# Patient Record
Sex: MALE | Race: WHITE | NOT HISPANIC OR LATINO | Employment: OTHER | URBAN - NONMETROPOLITAN AREA
[De-identification: names, ages, dates, MRNs, and addresses within clinical notes are randomized per-mention and may not be internally consistent; named-entity substitution may affect disease eponyms.]

---

## 2017-01-04 ENCOUNTER — OFFICE VISIT (OUTPATIENT)
Dept: ORTHOPEDIC SURGERY | Facility: CLINIC | Age: 47
End: 2017-01-04

## 2017-01-04 VITALS — WEIGHT: 226 LBS | HEIGHT: 73 IN | BODY MASS INDEX: 29.95 KG/M2 | RESPIRATION RATE: 16 BRPM

## 2017-01-04 DIAGNOSIS — S91.301D WOUND, OPEN, FOOT, RIGHT, SUBSEQUENT ENCOUNTER: Primary | ICD-10-CM

## 2017-01-04 DIAGNOSIS — E11.49 TYPE 2 DIABETES MELLITUS WITH OTHER NEUROLOGIC COMPLICATION: ICD-10-CM

## 2017-01-04 PROCEDURE — 99213 OFFICE O/P EST LOW 20 MIN: CPT | Performed by: PODIATRIST

## 2017-01-04 NOTE — MR AVS SNAPSHOT
Stephan Sterling   1/4/2017 1:20 PM   Office Visit    Dept Phone:  [phone]   Encounter #:  [id number]    Provider:  Joseph Null DPM   Department:  [facility]                Your Full Care Plan              Your Updated Medication List          This list is accurate as of: 1/4/17  1:17 PM.  Always use your most recent med list.                glimepiride 2 MG tablet   Commonly known as:  AMARYL       metFORMIN  MG 24 hr tablet   Commonly known as:  GLUCOPHAGE-XR       predniSONE 10 MG tablet   Commonly known as:  DELTASONE       sulfamethoxazole-trimethoprim 800-160 MG per tablet   Commonly known as:  BACTRIM DS,SEPTRA DS               You Were Diagnosed With        Codes Comments    Wound, open, foot, right, subsequent encounter    -  Primary ICD-10-CM: S91.301D  ICD-9-CM: V58.89, 892.0     Type 2 diabetes mellitus with other neurologic complication     ICD-10-CM: E11.49       Medications to be Given to You by a Medical Professional     Due       Frequency    11/23/2016 collagenase ointment  Every 24 Hours Scheduled      Instructions     None    Patient Instructions History      Upcoming Appointments     Visit Type Date Time Department    OFFICE VISIT 1/4/2017  1:20 PM [facility]      MyChart Signup     Our records indicate that you have declined [facility] Many[facility] signup. If you would like to sign up for Manyetahart, please email [employer] [email or web address] or call [phone] to obtain an activation code.             Other Info from Your Visit           Your Appointments     Jan 04, 2017  1:20 PM EST   Office Visit with Joseph Null DPM   [facility]INE (--)    [address] [place] [id number]   [phone]           Arrive 15 minutes prior to appointment.              Allergies     No Known Allergies      Reason for Visit     Right Foot -  "Follow-up     Wound Check           Vital Signs     Respirations Height Weight Body Mass Index Smoking Status       16 73\" (185.4 cm) 226 lb (103 kg) 29.82 kg/m2 Never Smoker       Problems and Diagnoses Noted     Open wound of foot    -  Primary    Type 2 diabetes mellitus with other neurologic complication            "

## 2017-01-04 NOTE — PROGRESS NOTES
Subjective Right foot  Patient ID: Stephan Sterling is a 46 y.o. male   With diabetes is here for follow-up for his plantar right foot wound.  He states he hasn't palmar Dr. Ortega on the 25th for diabetic shoes and inserts.    History of Present Illness    Review of Systems   Constitutional: Negative for diaphoresis, fever and unexpected weight change.   HENT: Negative for dental problem and sore throat.    Eyes: Negative for visual disturbance.   Respiratory: Negative for shortness of breath.    Cardiovascular: Negative for chest pain.   Gastrointestinal: Negative for abdominal pain, constipation, diarrhea, nausea and vomiting.   Genitourinary: Negative for difficulty urinating and frequency.   Neurological: Negative for headaches.   Hematological: Does not bruise/bleed easily.   All other systems reviewed and are negative.      History reviewed. No pertinent past medical history.     History reviewed. No pertinent past surgical history.    No Known Allergies    Objective   Physical Exam   Constitutional: He appears well-developed and well-nourished.   Neurological: He is alert.   Psychiatric: He has a normal mood and affect. His behavior is normal.   Vitals reviewed.    Ortho Exam  Ortho Exam    He still has a cavovarus foot type with increased medial longitudinal arch.  His a plantar arch wound on the right foot completely healed and re-epithelialized now.    Assessment/Plan  resolved right foot wound  Independent Review of Radiographic Studies:      Laboratory and Other Studies:     Medical Decision Making:        Procedures  Debridement Note        Recommendations/Plan:  I recommend he just put lotion or some type of moisturizing agent on his foot and followed by Dr. Ortega for diabetic shoes and inserts.  If He has any complications or issues he can let me know.    No Follow-up on file.  Patient agreeable to call or return sooner for any concerns.

## 2020-04-09 ENCOUNTER — HOSPITAL ENCOUNTER (EMERGENCY)
Facility: HOSPITAL | Age: 50
Discharge: ANOTHER HOSPITAL | End: 2020-04-09
Attending: EMERGENCY MEDICINE | Admitting: EMERGENCY MEDICINE

## 2020-04-09 VITALS
TEMPERATURE: 99 F | OXYGEN SATURATION: 93 % | RESPIRATION RATE: 18 BRPM | WEIGHT: 274 LBS | SYSTOLIC BLOOD PRESSURE: 159 MMHG | HEART RATE: 97 BPM | DIASTOLIC BLOOD PRESSURE: 92 MMHG | BODY MASS INDEX: 32.35 KG/M2 | HEIGHT: 77 IN

## 2020-04-09 DIAGNOSIS — S93.05XA DISLOCATION OF LEFT ANKLE JOINT, INITIAL ENCOUNTER: ICD-10-CM

## 2020-04-09 DIAGNOSIS — S82.892A CLOSED FRACTURE OF LEFT ANKLE, INITIAL ENCOUNTER: Primary | ICD-10-CM

## 2020-04-09 LAB
ALBUMIN SERPL-MCNC: 3.3 G/DL (ref 3.5–5.2)
ALBUMIN/GLOB SERPL: 0.7 G/DL
ALP SERPL-CCNC: 244 U/L (ref 39–117)
ALT SERPL W P-5'-P-CCNC: 59 U/L (ref 1–41)
ANION GAP SERPL CALCULATED.3IONS-SCNC: 15.3 MMOL/L (ref 5–15)
AST SERPL-CCNC: 52 U/L (ref 1–40)
BASOPHILS # BLD AUTO: 0.04 10*3/MM3 (ref 0–0.2)
BASOPHILS NFR BLD AUTO: 0.3 % (ref 0–1.5)
BILIRUB SERPL-MCNC: 0.9 MG/DL (ref 0.2–1.2)
BUN BLD-MCNC: 17 MG/DL (ref 6–20)
BUN/CREAT SERPL: 12.9 (ref 7–25)
CALCIUM SPEC-SCNC: 9.6 MG/DL (ref 8.6–10.5)
CHLORIDE SERPL-SCNC: 95 MMOL/L (ref 98–107)
CO2 SERPL-SCNC: 22.7 MMOL/L (ref 22–29)
CREAT BLD-MCNC: 1.32 MG/DL (ref 0.76–1.27)
DEPRECATED RDW RBC AUTO: 41.4 FL (ref 37–54)
EOSINOPHIL # BLD AUTO: 0.02 10*3/MM3 (ref 0–0.4)
EOSINOPHIL NFR BLD AUTO: 0.1 % (ref 0.3–6.2)
ERYTHROCYTE [DISTWIDTH] IN BLOOD BY AUTOMATED COUNT: 12.6 % (ref 12.3–15.4)
GFR SERPL CREATININE-BSD FRML MDRD: 57 ML/MIN/1.73
GLOBULIN UR ELPH-MCNC: 4.7 GM/DL
GLUCOSE BLD-MCNC: 212 MG/DL (ref 65–99)
HCT VFR BLD AUTO: 32.4 % (ref 37.5–51)
HGB BLD-MCNC: 10.8 G/DL (ref 13–17.7)
IMM GRANULOCYTES # BLD AUTO: 0.09 10*3/MM3 (ref 0–0.05)
IMM GRANULOCYTES NFR BLD AUTO: 0.6 % (ref 0–0.5)
LYMPHOCYTES # BLD AUTO: 1.23 10*3/MM3 (ref 0.7–3.1)
LYMPHOCYTES NFR BLD AUTO: 8.3 % (ref 19.6–45.3)
MCH RBC QN AUTO: 30.2 PG (ref 26.6–33)
MCHC RBC AUTO-ENTMCNC: 33.3 G/DL (ref 31.5–35.7)
MCV RBC AUTO: 90.5 FL (ref 79–97)
MONOCYTES # BLD AUTO: 1.53 10*3/MM3 (ref 0.1–0.9)
MONOCYTES NFR BLD AUTO: 10.3 % (ref 5–12)
NEUTROPHILS # BLD AUTO: 11.91 10*3/MM3 (ref 1.7–7)
NEUTROPHILS NFR BLD AUTO: 80.4 % (ref 42.7–76)
NRBC BLD AUTO-RTO: 0 /100 WBC (ref 0–0.2)
PLATELET # BLD AUTO: 518 10*3/MM3 (ref 140–450)
PMV BLD AUTO: 8.9 FL (ref 6–12)
POTASSIUM BLD-SCNC: 5.2 MMOL/L (ref 3.5–5.2)
PROT SERPL-MCNC: 8 G/DL (ref 6–8.5)
RBC # BLD AUTO: 3.58 10*6/MM3 (ref 4.14–5.8)
SODIUM BLD-SCNC: 133 MMOL/L (ref 136–145)
WBC NRBC COR # BLD: 14.82 10*3/MM3 (ref 3.4–10.8)

## 2020-04-09 PROCEDURE — 96365 THER/PROPH/DIAG IV INF INIT: CPT

## 2020-04-09 PROCEDURE — 25010000002 VANCOMYCIN 5 G RECONSTITUTED SOLUTION 5,000 MG VIAL: Performed by: PHYSICIAN ASSISTANT

## 2020-04-09 PROCEDURE — 99284 EMERGENCY DEPT VISIT MOD MDM: CPT

## 2020-04-09 PROCEDURE — 85025 COMPLETE CBC W/AUTO DIFF WBC: CPT | Performed by: PHYSICIAN ASSISTANT

## 2020-04-09 PROCEDURE — 80053 COMPREHEN METABOLIC PANEL: CPT | Performed by: PHYSICIAN ASSISTANT

## 2020-04-09 RX ORDER — SODIUM CHLORIDE 0.9 % (FLUSH) 0.9 %
10 SYRINGE (ML) INJECTION AS NEEDED
Status: DISCONTINUED | OUTPATIENT
Start: 2020-04-09 | End: 2020-04-09 | Stop reason: HOSPADM

## 2020-04-09 RX ADMIN — VANCOMYCIN HYDROCHLORIDE 2000 MG: 500 INJECTION, POWDER, LYOPHILIZED, FOR SOLUTION INTRAVENOUS at 14:49

## 2020-04-09 NOTE — ED PROVIDER NOTES
"Subjective   This patient states 2 weeks ago he tripped and fell injuring his left ankle.  He did not seek care until today.  He had an x-ray performed which showed \"fracture dislocation with the talus dislocated laterally and anteriorly in relation to the distal tibia and calcaneus.  There is a transverse fracture of the distal fibula above the level of the lateral malleolus with mild posterior and lateral displacement of the distal fracture fragment.  There is probable talar fracture although is considerable overlap of all making evaluation of the talus difficult.  The talus is also dislocated from the adjacent navicular.\"  He has a history of diabetes, hyperlipidemia and hypertension.  He has been ambulating around on this ankle since the injury 2 weeks ago.  He states he has chronic open wounds to the foot and ankle as well.          Review of Systems   Constitutional: Negative.  Negative for fever.   HENT: Negative.    Eyes: Negative.    Respiratory: Negative.    Cardiovascular: Negative.    Gastrointestinal: Negative.    Genitourinary: Negative.    Musculoskeletal:        Left ankle pain, swelling, deformity   Skin:        Open wounds to the left foot and ankle   Allergic/Immunologic: Negative.    Neurological: Negative.    Psychiatric/Behavioral: Negative.    All other systems reviewed and are negative.      Past Medical History:   Diagnosis Date   • Diabetes mellitus (CMS/[facility])    • Hyperlipidemia    • Hypertension        No Known Allergies    History reviewed. No pertinent surgical history.    History reviewed. No pertinent family history.    Social History     Socioeconomic History   • Marital status: Single     Spouse name: Not on file   • Number of children: Not on file   • Years of education: Not on file   • Highest education level: Not on file   Tobacco Use   • Smoking status: Never Smoker   • Smokeless tobacco: Never Used   Substance and Sexual Activity   • Alcohol use: Never     Frequency: Never   • " Drug use: Never   • Sexual activity: Defer           Objective   Physical Exam   Constitutional: He appears well-developed and well-nourished.   HENT:   Head: Normocephalic and atraumatic.   Neck: Normal range of motion.   Cardiovascular: Normal rate and regular rhythm.   Pulmonary/Chest: Effort normal.   Musculoskeletal:   Obvious deformity to left ankle with open diabetic wounds with medicated cream and suspected purulent drainage from 1 on the lateral ankle and 1 on the medial foot.  Foot and toes has brisk capillary refill.   Neurological: He is alert.   Skin: Skin is warm and dry.   Mild erythema to the left foot and ankle with open wounds as mentioned above   Psychiatric: He has a normal mood and affect. His behavior is normal. Thought content normal.   Nursing note and vitals reviewed.      Procedures           ED Course  ED Course as of Apr 09 1621   Thu Apr 09, 2020   1445 WBC(!): 14.82 [TM]      ED Course User Index  [TM] Darrell Mai PA-C                                           MDM  I spoke with Dr. Morton regarding the patient's images and presentation and he states patient needs a higher level of care and transfer where I fellowship trained foot and ankle specialist can see and evaluate him.  Attempted to reach Dr. Juarez here but office phone rung unanswered. I spoke with Dr. Zenon Preciado orthopedic surgeon at [facility] who graciously accepts this patient in transfer to the emergency department at [place].  Final diagnoses:   Closed fracture of left ankle, initial encounter   Dislocation of left ankle joint, initial encounter            Darrell Mai PA-C  04/09/20 1547       Darrell Mai PA-C  04/09/20 1621

## 2020-10-18 ENCOUNTER — HOSPITAL ENCOUNTER (EMERGENCY)
Facility: HOSPITAL | Age: 50
Discharge: PSYCHIATRIC OR SUBSTANCE USE FACILITY | End: 2020-10-19
Attending: STUDENT IN AN ORGANIZED HEALTH CARE EDUCATION/TRAINING PROGRAM | Admitting: STUDENT IN AN ORGANIZED HEALTH CARE EDUCATION/TRAINING PROGRAM

## 2020-10-18 ENCOUNTER — APPOINTMENT (OUTPATIENT)
Dept: CT IMAGING | Facility: HOSPITAL | Age: 50
End: 2020-10-18

## 2020-10-18 DIAGNOSIS — M86.9 OSTEOMYELITIS OF LEFT ANKLE, UNSPECIFIED TYPE (HCC): ICD-10-CM

## 2020-10-18 DIAGNOSIS — M00.9 SEPTIC ARTHRITIS OF LEFT ANKLE, DUE TO UNSPECIFIED ORGANISM (HCC): Primary | ICD-10-CM

## 2020-10-18 LAB
ALBUMIN SERPL-MCNC: 2.6 G/DL (ref 3.5–5.2)
ALBUMIN/GLOB SERPL: 0.6 G/DL
ALP SERPL-CCNC: 404 U/L (ref 39–117)
ALT SERPL W P-5'-P-CCNC: 29 U/L (ref 1–41)
ANION GAP SERPL CALCULATED.3IONS-SCNC: 9.4 MMOL/L (ref 5–15)
AST SERPL-CCNC: 22 U/L (ref 1–40)
BASOPHILS # BLD AUTO: 0.06 10*3/MM3 (ref 0–0.2)
BASOPHILS NFR BLD AUTO: 0.4 % (ref 0–1.5)
BILIRUB SERPL-MCNC: 1.2 MG/DL (ref 0–1.2)
BUN SERPL-MCNC: 13 MG/DL (ref 6–20)
BUN/CREAT SERPL: 10.8 (ref 7–25)
CALCIUM SPEC-SCNC: 8.8 MG/DL (ref 8.6–10.5)
CHLORIDE SERPL-SCNC: 104 MMOL/L (ref 98–107)
CO2 SERPL-SCNC: 21.6 MMOL/L (ref 22–29)
CREAT SERPL-MCNC: 1.2 MG/DL (ref 0.76–1.27)
D-LACTATE SERPL-SCNC: 0.8 MMOL/L (ref 0.5–2)
DEPRECATED RDW RBC AUTO: 49.9 FL (ref 37–54)
EOSINOPHIL # BLD AUTO: 0.22 10*3/MM3 (ref 0–0.4)
EOSINOPHIL NFR BLD AUTO: 1.5 % (ref 0.3–6.2)
ERYTHROCYTE [DISTWIDTH] IN BLOOD BY AUTOMATED COUNT: 15.3 % (ref 12.3–15.4)
GFR SERPL CREATININE-BSD FRML MDRD: 64 ML/MIN/1.73
GLOBULIN UR ELPH-MCNC: 4.1 GM/DL
GLUCOSE SERPL-MCNC: 173 MG/DL (ref 65–99)
HCT VFR BLD AUTO: 26.3 % (ref 37.5–51)
HGB BLD-MCNC: 8.3 G/DL (ref 13–17.7)
IMM GRANULOCYTES # BLD AUTO: 0.09 10*3/MM3 (ref 0–0.05)
IMM GRANULOCYTES NFR BLD AUTO: 0.6 % (ref 0–0.5)
LYMPHOCYTES # BLD AUTO: 1.76 10*3/MM3 (ref 0.7–3.1)
LYMPHOCYTES NFR BLD AUTO: 12.3 % (ref 19.6–45.3)
MCH RBC QN AUTO: 28.1 PG (ref 26.6–33)
MCHC RBC AUTO-ENTMCNC: 31.6 G/DL (ref 31.5–35.7)
MCV RBC AUTO: 89.2 FL (ref 79–97)
MONOCYTES # BLD AUTO: 1.32 10*3/MM3 (ref 0.1–0.9)
MONOCYTES NFR BLD AUTO: 9.2 % (ref 5–12)
NEUTROPHILS NFR BLD AUTO: 10.9 10*3/MM3 (ref 1.7–7)
NEUTROPHILS NFR BLD AUTO: 76 % (ref 42.7–76)
NRBC BLD AUTO-RTO: 0 /100 WBC (ref 0–0.2)
PLATELET # BLD AUTO: 440 10*3/MM3 (ref 140–450)
PMV BLD AUTO: 8.5 FL (ref 6–12)
POTASSIUM SERPL-SCNC: 3.9 MMOL/L (ref 3.5–5.2)
PROT SERPL-MCNC: 6.7 G/DL (ref 6–8.5)
RBC # BLD AUTO: 2.95 10*6/MM3 (ref 4.14–5.8)
SODIUM SERPL-SCNC: 135 MMOL/L (ref 136–145)
WBC # BLD AUTO: 14.35 10*3/MM3 (ref 3.4–10.8)

## 2020-10-18 PROCEDURE — 83605 ASSAY OF LACTIC ACID: CPT | Performed by: STUDENT IN AN ORGANIZED HEALTH CARE EDUCATION/TRAINING PROGRAM

## 2020-10-18 PROCEDURE — 85025 COMPLETE CBC W/AUTO DIFF WBC: CPT | Performed by: STUDENT IN AN ORGANIZED HEALTH CARE EDUCATION/TRAINING PROGRAM

## 2020-10-18 PROCEDURE — 99283 EMERGENCY DEPT VISIT LOW MDM: CPT

## 2020-10-18 PROCEDURE — 73701 CT LOWER EXTREMITY W/DYE: CPT

## 2020-10-18 PROCEDURE — 80053 COMPREHEN METABOLIC PANEL: CPT | Performed by: STUDENT IN AN ORGANIZED HEALTH CARE EDUCATION/TRAINING PROGRAM

## 2020-10-18 PROCEDURE — 87040 BLOOD CULTURE FOR BACTERIA: CPT | Performed by: STUDENT IN AN ORGANIZED HEALTH CARE EDUCATION/TRAINING PROGRAM

## 2020-10-18 PROCEDURE — 96365 THER/PROPH/DIAG IV INF INIT: CPT

## 2020-10-18 PROCEDURE — 25010000002 VANCOMYCIN 1 G RECONSTITUTED SOLUTION 1 EACH VIAL: Performed by: STUDENT IN AN ORGANIZED HEALTH CARE EDUCATION/TRAINING PROGRAM

## 2020-10-18 PROCEDURE — 25010000002 IOPAMIDOL 61 % SOLUTION: Performed by: STUDENT IN AN ORGANIZED HEALTH CARE EDUCATION/TRAINING PROGRAM

## 2020-10-18 RX ADMIN — IOPAMIDOL 100 ML: 612 INJECTION, SOLUTION INTRAVENOUS at 23:28

## 2020-10-18 RX ADMIN — VANCOMYCIN HYDROCHLORIDE 1000 MG: 1 INJECTION, POWDER, LYOPHILIZED, FOR SOLUTION INTRAVENOUS at 23:25

## 2020-10-19 VITALS
DIASTOLIC BLOOD PRESSURE: 63 MMHG | OXYGEN SATURATION: 99 % | SYSTOLIC BLOOD PRESSURE: 110 MMHG | HEART RATE: 91 BPM | WEIGHT: 234 LBS | HEIGHT: 77 IN | TEMPERATURE: 98.4 F | RESPIRATION RATE: 19 BRPM | BODY MASS INDEX: 27.63 KG/M2

## 2020-10-19 NOTE — ED NOTES
Contacted [facility], Lab, to obtain blood [place].      Robin, ALEJANDRINA Thompson  10/18/20 2794

## 2020-10-19 NOTE — ED PROVIDER NOTES
Subjective   50-year-old male that presents with left foot and ankle pain and swelling.  States it has worsened over the past 3 days after wearing a walking boot.  The patient had a trimalleolar fracture several months ago that was repaired at the [facility].  Patient states he is still been wearing the walking boot.  States that he had an ulceration on the top of his foot that he is covered with Silvadene.  Patient states that it hurts a little bit but it is not that bad.  He has been taking Motrin for this.  Patient does have diabetic neuropathy in his feet.  He denies fever, chills, sweats or other systemic symptoms.          Review of Systems   All other systems reviewed and are negative.      Past Medical History:   Diagnosis Date   • Diabetes mellitus (CMS/[facility])    • Hyperlipidemia    • Hypertension        No Known Allergies    History reviewed. No pertinent surgical history.    History reviewed. No pertinent family history.    Social History     Socioeconomic History   • Marital status: Single     Spouse name: Not on file   • Number of children: Not on file   • Years of education: Not on file   • Highest education level: Not on file   Tobacco Use   • Smoking status: Never Smoker   • Smokeless tobacco: Never Used   Substance and Sexual Activity   • Alcohol use: Never     Frequency: Never   • Drug use: Never   • Sexual activity: Defer           Objective   Physical Exam  Vitals signs and nursing note reviewed.   Musculoskeletal:        Feet:      GEN: No acute distress  Head: Normocephalic, atraumatic  Eyes: Pupils equal round reactive to light  ENT: Posterior pharynx normal in appearance, oral mucosa is moist  Chest: Nontender to palpation  Cardiovascular: Regular rate  Lungs: Clear to auscultation bilaterally  Abdomen: Soft, nontender, nondistended, no peritoneal signs  Extremities: See graphic documentation  Neuro: GCS 15  Psych: Mood and affect are appropriate    Procedures           ED  Course  ED Course as of Oct 19 0038   Mon Oct 19, 2020   0022 Spoke with Dr. Sevilla, orthopedics, who recommended the patient since he had been operated on at the [facility] return back to them.    [DT]   0025 Spoke with Mesha  Jeannine, gave her the patient information.  She is going to call me back when she is in touch with surgeon.    [DT]   0032 Dr. Fox, orthopedics at , accepted for transfer to their emergency department.    [DT]      ED Course User Index  [DT] Zenon Mohan MD                                           MDM  Number of Diagnoses or Management Options  Osteomyelitis of left ankle, unspecified type (CMS/HCC):   Septic arthritis of left ankle, due to unspecified organism (CMS/HCC):   Diagnosis management comments: Will get labs and once I get a creatinine back we will determine what kind of imaging I can do to assess whether he has an abscess or if this is more edema.  Patient had a trimalleolar fracture for 5 months ago and walked on this for about a week before he came into the hospital.  It is difficult to assess the level of pain in this patient.  Will order antibiotics preemptively just given appearance.        CT of the left lower extremity with contrast shows a large collection of fluid within ankle joint surrounding soft tissues, containing a few tiny locules of gas.  Findings are concerning for abscess and septic arthritis.  There is a sclerotic proximal talus suspicious for osteomyelitis.  Ankle mortise is malaligned with lateral subluxation of the talus relative the distal tibia and abnormally wide tibiotalar synchondrosis          35 minutes critical care time was spent by the attending physician excluding separately billable procedures         Amount and/or Complexity of Data Reviewed  Clinical lab tests: ordered  Discussion of test results with the performing providers: yes  Decide to obtain previous medical records or to obtain history from someone other than the  patient: yes  Obtain history from someone other than the patient: yes  Review and summarize past medical records: yes  Discuss the patient with other providers: yes  Independent visualization of images, tracings, or specimens: yes        Final diagnoses:   Septic arthritis of left ankle, due to unspecified organism (CMS/HCC)   Osteomyelitis of left ankle, unspecified type (CMS/[facility])            Zenon Mohan MD  10/19/20 0040

## 2020-10-19 NOTE — ED NOTES
Contacted [facility] to request EMS transport to .     Thiago April Heather  10/19/20 0104

## 2020-10-23 LAB
BACTERIA SPEC AEROBE CULT: NORMAL
BACTERIA SPEC AEROBE CULT: NORMAL

## 2022-06-03 ENCOUNTER — HOSPITAL ENCOUNTER (EMERGENCY)
Facility: HOSPITAL | Age: 52
Discharge: HOME | End: 2022-06-03
Attending: EMERGENCY MEDICINE | Admitting: FAMILY MEDICINE

## 2022-06-03 VITALS
HEART RATE: 88 BPM | DIASTOLIC BLOOD PRESSURE: 72 MMHG | RESPIRATION RATE: 18 BRPM | OXYGEN SATURATION: 100 % | HEIGHT: 77 IN | TEMPERATURE: 98.6 F | BODY MASS INDEX: 30.11 KG/M2 | SYSTOLIC BLOOD PRESSURE: 154 MMHG | WEIGHT: 255 LBS

## 2022-06-03 DIAGNOSIS — L98.491 SKIN ULCER, LIMITED TO BREAKDOWN OF SKIN: Primary | ICD-10-CM

## 2022-06-03 DIAGNOSIS — T14.8XXA PUNCTURE WOUND: ICD-10-CM

## 2022-06-03 PROCEDURE — 25010000002 TETANUS-DIPHTH-ACELL PERTUSSIS 5-2.5-18.5 LF-MCG/0.5 SUSPENSION PREFILLED SYRINGE: Performed by: PHYSICIAN ASSISTANT

## 2022-06-03 PROCEDURE — 99282 EMERGENCY DEPT VISIT SF MDM: CPT

## 2022-06-03 PROCEDURE — 0 LIDOCAINE 1 % SOLUTION: Performed by: PHYSICIAN ASSISTANT

## 2022-06-03 PROCEDURE — 90715 TDAP VACCINE 7 YRS/> IM: CPT | Performed by: PHYSICIAN ASSISTANT

## 2022-06-03 PROCEDURE — 90471 IMMUNIZATION ADMIN: CPT | Performed by: PHYSICIAN ASSISTANT

## 2022-06-03 RX ORDER — LIDOCAINE HYDROCHLORIDE 10 MG/ML
10 INJECTION, SOLUTION INFILTRATION; PERINEURAL ONCE
Status: COMPLETED | OUTPATIENT
Start: 2022-06-03 | End: 2022-06-03

## 2022-06-03 RX ADMIN — LIDOCAINE HYDROCHLORIDE 10 ML: 10 INJECTION, SOLUTION INFILTRATION; PERINEURAL at 19:43

## 2022-06-03 RX ADMIN — TETANUS TOXOID, REDUCED DIPHTHERIA TOXOID AND ACELLULAR PERTUSSIS VACCINE, ADSORBED 0.5 ML: 5; 2.5; 8; 8; 2.5 SUSPENSION INTRAMUSCULAR at 19:12

## 2022-06-03 NOTE — ED PROVIDER NOTES
Subjective   51-year-old male presents with a wound on his left below-knee amputation.  Spoke to his daughter who helps with his medical history, she states that he had a below the knee amputation at [facility] in 2020, she states it was in December.  He admits that he has been using a push mower and causing a lot of pressure on that prosthetic, he now has skin ulceration and in the middle of the ulceration there is wound that is bleeding.  He states this has been going on for few days.      History provided by:  Patient   used: No        Review of Systems   Skin:        Left stump BKA ulceration of skin with puncture wound   All other systems reviewed and are negative.      Past Medical History:   Diagnosis Date   • Diabetes mellitus (HCC)    • Hyperlipidemia    • Hypertension        No Known Allergies    History reviewed. No pertinent surgical history.    History reviewed. No pertinent family history.    Social History     Socioeconomic History   • Marital status: Single   Tobacco Use   • Smoking status: Never Smoker   • Smokeless tobacco: Never Used   Vaping Use   • Vaping Use: Never used   Substance and Sexual Activity   • Alcohol use: Never   • Drug use: Never   • Sexual activity: Defer           Objective   Physical Exam  Vitals and nursing note reviewed.   Constitutional:       Appearance: He is well-developed.   HENT:      Head: Normocephalic and atraumatic.   Cardiovascular:      Rate and Rhythm: Normal rate and regular rhythm.   Pulmonary:      Effort: Pulmonary effort is normal.      Breath sounds: Normal breath sounds.   Musculoskeletal:         General: Normal range of motion.      Cervical back: Normal range of motion.        Legs:       Comments: Left BKA, skin ulceration of the stump with a small open laceration   Skin:     General: Skin is warm and dry.   Neurological:      Mental Status: He is alert and oriented to person, place, and time.   Psychiatric:          Behavior: Behavior normal.         Laceration Repair    Date/Time: 6/3/2022 8:20 PM  Performed by: Richard Phillips Jr., PA-C  Authorized by: Michelle Campbell DO     Consent:     Consent obtained:  Verbal    Consent given by:  Patient    Risks discussed:  Pain and poor cosmetic result  Universal protocol:     Patient identity confirmed:  Verbally with patient  Anesthesia:     Anesthesia method:  Local infiltration    Local anesthetic:  Lidocaine 1% w/o epi  Laceration details:     Location:  Leg    Leg location: left bka.    Length (cm):  1  Exploration:     Limited defect created (wound extended): no      Hemostasis achieved with:  Direct pressure    Wound exploration: wound explored through full range of motion      Contaminated: no    Treatment:     Area cleansed with:  Chlorhexidine    Amount of cleaning:  Standard  Skin repair:     Repair method:  Sutures    Suture size:  4-0    Suture material:  Prolene    Number of sutures:  1  Approximation:     Approximation:  Close  Post-procedure details:     Dressing:  Sterile dressing    Procedure completion:  Tolerated               ED Course                                                 MDM  Number of Diagnoses or Management Options  Puncture wound: new and requires workup  Skin ulcer, limited to breakdown of skin (HCC): new and requires workup  Risk of Complications, Morbidity, and/or Mortality  Presenting problems: low  Management options: low    Patient Progress  Patient progress: stable      Final diagnoses:   Skin ulcer, limited to breakdown of skin (HCC)   Puncture wound       ED Disposition  ED Disposition     ED Disposition   Discharge    Condition   Stable    Comment   --             [facility]  [address] [address] [phone]  In 3 days  For wound re-check         Medication List      No changes were made to your prescriptions during this visit.          Richard Phillips Jr.,  SVEN  06/03/22 2037

## 2022-07-15 ENCOUNTER — TRANSCRIBE ORDERS (OUTPATIENT)
Dept: ADMINISTRATIVE | Facility: HOSPITAL | Age: 52
End: 2022-07-15

## 2022-07-15 DIAGNOSIS — B99.9 INFECTION: Primary | ICD-10-CM

## 2022-07-19 ENCOUNTER — HOSPITAL ENCOUNTER (OUTPATIENT)
Dept: MRI IMAGING | Facility: HOSPITAL | Age: 52
Discharge: HOME | End: 2022-07-19
Admitting: ORTHOPAEDIC SURGERY

## 2022-07-19 DIAGNOSIS — B99.9 INFECTION: ICD-10-CM

## 2022-07-19 PROCEDURE — 0 GADOBENATE DIMEGLUMINE 529 MG/ML SOLUTION: Performed by: ORTHOPAEDIC SURGERY

## 2022-07-19 PROCEDURE — 73720 MRI LWR EXTREMITY W/O&W/DYE: CPT

## 2022-07-19 PROCEDURE — A9577 INJ MULTIHANCE: HCPCS | Performed by: ORTHOPAEDIC SURGERY

## 2022-07-19 RX ADMIN — GADOBENATE DIMEGLUMINE 20 ML: 529 INJECTION, SOLUTION INTRAVENOUS at 18:00

## 2024-09-27 ENCOUNTER — APPOINTMENT (OUTPATIENT)
Dept: CT IMAGING | Facility: HOSPITAL | Age: 54
End: 2024-09-27
Payer: MEDICARE

## 2024-09-27 ENCOUNTER — APPOINTMENT (OUTPATIENT)
Dept: GENERAL RADIOLOGY | Facility: HOSPITAL | Age: 54
End: 2024-09-27
Payer: MEDICARE

## 2024-09-27 ENCOUNTER — HOSPITAL ENCOUNTER (EMERGENCY)
Facility: HOSPITAL | Age: 54
Discharge: HOME | End: 2024-09-27
Attending: STUDENT IN AN ORGANIZED HEALTH CARE EDUCATION/TRAINING PROGRAM
Payer: MEDICARE

## 2024-09-27 VITALS
OXYGEN SATURATION: 90 % | HEIGHT: 77 IN | WEIGHT: 255.73 LBS | RESPIRATION RATE: 18 BRPM | BODY MASS INDEX: 30.2 KG/M2 | DIASTOLIC BLOOD PRESSURE: 82 MMHG | TEMPERATURE: 98.2 F | SYSTOLIC BLOOD PRESSURE: 146 MMHG | HEART RATE: 80 BPM

## 2024-09-27 DIAGNOSIS — R03.0 ELEVATED BLOOD PRESSURE READING: ICD-10-CM

## 2024-09-27 DIAGNOSIS — K52.9 GASTROENTERITIS: Primary | ICD-10-CM

## 2024-09-27 LAB
ALBUMIN SERPL-MCNC: 4.4 G/DL (ref 3.5–5.2)
ALBUMIN/GLOB SERPL: 1.2 G/DL
ALP SERPL-CCNC: 100 U/L (ref 39–117)
ALT SERPL W P-5'-P-CCNC: 14 U/L (ref 1–41)
ANION GAP SERPL CALCULATED.3IONS-SCNC: 18.5 MMOL/L (ref 5–15)
AST SERPL-CCNC: 17 U/L (ref 1–40)
BACTERIA UR QL AUTO: ABNORMAL /HPF
BASOPHILS # BLD AUTO: 0.04 10*3/MM3 (ref 0–0.2)
BASOPHILS NFR BLD AUTO: 0.3 % (ref 0–1.5)
BILIRUB SERPL-MCNC: 0.9 MG/DL (ref 0–1.2)
BILIRUB UR QL STRIP: NEGATIVE
BUN SERPL-MCNC: 20 MG/DL (ref 6–20)
BUN/CREAT SERPL: 16.5 (ref 7–25)
CALCIUM SPEC-SCNC: 9.4 MG/DL (ref 8.6–10.5)
CHLORIDE SERPL-SCNC: 97 MMOL/L (ref 98–107)
CLARITY UR: CLEAR
CO2 SERPL-SCNC: 20.5 MMOL/L (ref 22–29)
COLOR UR: YELLOW
CREAT SERPL-MCNC: 1.21 MG/DL (ref 0.76–1.27)
D-LACTATE SERPL-SCNC: 1.3 MMOL/L (ref 0.5–2)
DEPRECATED RDW RBC AUTO: 38.5 FL (ref 37–54)
EGFRCR SERPLBLD CKD-EPI 2021: 71.2 ML/MIN/1.73
EOSINOPHIL # BLD AUTO: 0.01 10*3/MM3 (ref 0–0.4)
EOSINOPHIL NFR BLD AUTO: 0.1 % (ref 0.3–6.2)
ERYTHROCYTE [DISTWIDTH] IN BLOOD BY AUTOMATED COUNT: 12.2 % (ref 12.3–15.4)
FLUAV RNA RESP QL NAA+PROBE: NOT DETECTED
FLUBV RNA RESP QL NAA+PROBE: NOT DETECTED
GEN 5 2HR TROPONIN T REFLEX: 23 NG/L
GLOBULIN UR ELPH-MCNC: 3.7 GM/DL
GLUCOSE SERPL-MCNC: 145 MG/DL (ref 65–99)
GLUCOSE UR STRIP-MCNC: ABNORMAL MG/DL
HCT VFR BLD AUTO: 46.9 % (ref 37.5–51)
HGB BLD-MCNC: 16.7 G/DL (ref 13–17.7)
HGB UR QL STRIP.AUTO: ABNORMAL
HOLD SPECIMEN: NORMAL
HOLD SPECIMEN: NORMAL
HYALINE CASTS UR QL AUTO: ABNORMAL /LPF
IMM GRANULOCYTES # BLD AUTO: 0.08 10*3/MM3 (ref 0–0.05)
IMM GRANULOCYTES NFR BLD AUTO: 0.6 % (ref 0–0.5)
KETONES UR QL STRIP: ABNORMAL
LEUKOCYTE ESTERASE UR QL STRIP.AUTO: NEGATIVE
LIPASE SERPL-CCNC: 24 U/L (ref 13–60)
LYMPHOCYTES # BLD AUTO: 2.17 10*3/MM3 (ref 0.7–3.1)
LYMPHOCYTES NFR BLD AUTO: 15.9 % (ref 19.6–45.3)
MCH RBC QN AUTO: 30.7 PG (ref 26.6–33)
MCHC RBC AUTO-ENTMCNC: 35.6 G/DL (ref 31.5–35.7)
MCV RBC AUTO: 86.2 FL (ref 79–97)
MONOCYTES # BLD AUTO: 1.64 10*3/MM3 (ref 0.1–0.9)
MONOCYTES NFR BLD AUTO: 12 % (ref 5–12)
MUCOUS THREADS URNS QL MICRO: ABNORMAL /HPF
NEUTROPHILS NFR BLD AUTO: 71.1 % (ref 42.7–76)
NEUTROPHILS NFR BLD AUTO: 9.72 10*3/MM3 (ref 1.7–7)
NITRITE UR QL STRIP: NEGATIVE
NRBC BLD AUTO-RTO: 0 /100 WBC (ref 0–0.2)
PH UR STRIP.AUTO: 5.5 [PH] (ref 5–8)
PLATELET # BLD AUTO: 381 10*3/MM3 (ref 140–450)
PMV BLD AUTO: 9.3 FL (ref 6–12)
POTASSIUM SERPL-SCNC: 4 MMOL/L (ref 3.5–5.2)
PROT SERPL-MCNC: 8.1 G/DL (ref 6–8.5)
PROT UR QL STRIP: ABNORMAL
RBC # BLD AUTO: 5.44 10*6/MM3 (ref 4.14–5.8)
RBC # UR STRIP: ABNORMAL /HPF
REF LAB TEST METHOD: ABNORMAL
SARS-COV-2 RNA RESP QL NAA+PROBE: NOT DETECTED
SODIUM SERPL-SCNC: 136 MMOL/L (ref 136–145)
SP GR UR STRIP: 1.03 (ref 1–1.03)
SQUAMOUS #/AREA URNS HPF: ABNORMAL /HPF
TROPONIN T DELTA: -2 NG/L
TROPONIN T SERPL HS-MCNC: 25 NG/L
UROBILINOGEN UR QL STRIP: ABNORMAL
WBC # UR STRIP: ABNORMAL /HPF
WBC NRBC COR # BLD AUTO: 13.66 10*3/MM3 (ref 3.4–10.8)
WHOLE BLOOD HOLD COAG: NORMAL
WHOLE BLOOD HOLD SPECIMEN: NORMAL

## 2024-09-27 PROCEDURE — 96361 HYDRATE IV INFUSION ADD-ON: CPT

## 2024-09-27 PROCEDURE — 96374 THER/PROPH/DIAG INJ IV PUSH: CPT

## 2024-09-27 PROCEDURE — 99285 EMERGENCY DEPT VISIT HI MDM: CPT

## 2024-09-27 PROCEDURE — 74177 CT ABD & PELVIS W/CONTRAST: CPT

## 2024-09-27 PROCEDURE — 25010000002 PROCHLORPERAZINE 10 MG/2ML SOLUTION

## 2024-09-27 PROCEDURE — 25010000002 HYDRALAZINE PER 20 MG

## 2024-09-27 PROCEDURE — 25010000002 DIPHENHYDRAMINE PER 50 MG

## 2024-09-27 PROCEDURE — 85025 COMPLETE CBC W/AUTO DIFF WBC: CPT | Performed by: STUDENT IN AN ORGANIZED HEALTH CARE EDUCATION/TRAINING PROGRAM

## 2024-09-27 PROCEDURE — 84484 ASSAY OF TROPONIN QUANT: CPT

## 2024-09-27 PROCEDURE — 71045 X-RAY EXAM CHEST 1 VIEW: CPT

## 2024-09-27 PROCEDURE — 25810000003 SODIUM CHLORIDE 0.9 % SOLUTION

## 2024-09-27 PROCEDURE — 25510000001 IOPAMIDOL 61 % SOLUTION: Performed by: STUDENT IN AN ORGANIZED HEALTH CARE EDUCATION/TRAINING PROGRAM

## 2024-09-27 PROCEDURE — 80053 COMPREHEN METABOLIC PANEL: CPT | Performed by: STUDENT IN AN ORGANIZED HEALTH CARE EDUCATION/TRAINING PROGRAM

## 2024-09-27 PROCEDURE — 83605 ASSAY OF LACTIC ACID: CPT | Performed by: STUDENT IN AN ORGANIZED HEALTH CARE EDUCATION/TRAINING PROGRAM

## 2024-09-27 PROCEDURE — 83690 ASSAY OF LIPASE: CPT | Performed by: STUDENT IN AN ORGANIZED HEALTH CARE EDUCATION/TRAINING PROGRAM

## 2024-09-27 PROCEDURE — 93005 ELECTROCARDIOGRAM TRACING: CPT

## 2024-09-27 PROCEDURE — 96375 TX/PRO/DX INJ NEW DRUG ADDON: CPT

## 2024-09-27 PROCEDURE — 25010000002 MORPHINE PER 10 MG: Performed by: STUDENT IN AN ORGANIZED HEALTH CARE EDUCATION/TRAINING PROGRAM

## 2024-09-27 PROCEDURE — 81001 URINALYSIS AUTO W/SCOPE: CPT | Performed by: STUDENT IN AN ORGANIZED HEALTH CARE EDUCATION/TRAINING PROGRAM

## 2024-09-27 PROCEDURE — 87636 SARSCOV2 & INF A&B AMP PRB: CPT

## 2024-09-27 PROCEDURE — 25010000002 ONDANSETRON PER 1 MG

## 2024-09-27 PROCEDURE — 25010000002 KETOROLAC TROMETHAMINE PER 15 MG

## 2024-09-27 RX ORDER — DICYCLOMINE HYDROCHLORIDE 10 MG/1
20 CAPSULE ORAL ONCE
Status: COMPLETED | OUTPATIENT
Start: 2024-09-27 | End: 2024-09-27

## 2024-09-27 RX ORDER — IOPAMIDOL 612 MG/ML
100 INJECTION, SOLUTION INTRAVASCULAR
Status: COMPLETED | OUTPATIENT
Start: 2024-09-27 | End: 2024-09-27

## 2024-09-27 RX ORDER — DICYCLOMINE HCL 20 MG
20 TABLET ORAL EVERY 6 HOURS PRN
Qty: 20 TABLET | Refills: 0 | Status: SHIPPED | OUTPATIENT
Start: 2024-09-27

## 2024-09-27 RX ORDER — ONDANSETRON 2 MG/ML
4 INJECTION INTRAMUSCULAR; INTRAVENOUS ONCE
Status: COMPLETED | OUTPATIENT
Start: 2024-09-27 | End: 2024-09-27

## 2024-09-27 RX ORDER — PROMETHAZINE HYDROCHLORIDE 25 MG/1
25 TABLET ORAL EVERY 6 HOURS PRN
Qty: 10 TABLET | Refills: 0 | Status: SHIPPED | OUTPATIENT
Start: 2024-09-27

## 2024-09-27 RX ORDER — SODIUM CHLORIDE 0.9 % (FLUSH) 0.9 %
10 SYRINGE (ML) INJECTION AS NEEDED
Status: DISCONTINUED | OUTPATIENT
Start: 2024-09-27 | End: 2024-09-27 | Stop reason: HOSPADM

## 2024-09-27 RX ORDER — PROCHLORPERAZINE EDISYLATE 5 MG/ML
5 INJECTION INTRAMUSCULAR; INTRAVENOUS ONCE
Status: COMPLETED | OUTPATIENT
Start: 2024-09-27 | End: 2024-09-27

## 2024-09-27 RX ORDER — DIPHENHYDRAMINE HYDROCHLORIDE 50 MG/ML
25 INJECTION INTRAMUSCULAR; INTRAVENOUS ONCE
Status: COMPLETED | OUTPATIENT
Start: 2024-09-27 | End: 2024-09-27

## 2024-09-27 RX ORDER — ONDANSETRON 4 MG/1
4 TABLET, ORALLY DISINTEGRATING ORAL EVERY 8 HOURS PRN
Qty: 12 TABLET | Refills: 0 | Status: SHIPPED | OUTPATIENT
Start: 2024-09-27

## 2024-09-27 RX ORDER — KETOROLAC TROMETHAMINE 30 MG/ML
15 INJECTION, SOLUTION INTRAMUSCULAR; INTRAVENOUS ONCE
Status: COMPLETED | OUTPATIENT
Start: 2024-09-27 | End: 2024-09-27

## 2024-09-27 RX ORDER — HYDRALAZINE HYDROCHLORIDE 20 MG/ML
10 INJECTION INTRAMUSCULAR; INTRAVENOUS ONCE
Status: COMPLETED | OUTPATIENT
Start: 2024-09-27 | End: 2024-09-27

## 2024-09-27 RX ADMIN — SODIUM CHLORIDE 1000 ML: 9 INJECTION, SOLUTION INTRAVENOUS at 10:34

## 2024-09-27 RX ADMIN — DIPHENHYDRAMINE HYDROCHLORIDE 25 MG: 50 INJECTION, SOLUTION INTRAMUSCULAR; INTRAVENOUS at 12:45

## 2024-09-27 RX ADMIN — IOPAMIDOL 100 ML: 612 INJECTION, SOLUTION INTRAVENOUS at 11:15

## 2024-09-27 RX ADMIN — MORPHINE SULFATE 4 MG: 4 INJECTION, SOLUTION INTRAMUSCULAR; INTRAVENOUS at 11:44

## 2024-09-27 RX ADMIN — PROCHLORPERAZINE EDISYLATE 5 MG: 5 INJECTION INTRAMUSCULAR; INTRAVENOUS at 12:45

## 2024-09-27 RX ADMIN — ONDANSETRON 4 MG: 2 INJECTION INTRAMUSCULAR; INTRAVENOUS at 10:34

## 2024-09-27 RX ADMIN — KETOROLAC TROMETHAMINE 15 MG: 30 INJECTION, SOLUTION INTRAMUSCULAR; INTRAVENOUS at 10:34

## 2024-09-27 RX ADMIN — HYDRALAZINE HYDROCHLORIDE 10 MG: 20 INJECTION INTRAMUSCULAR; INTRAVENOUS at 11:44

## 2024-09-27 RX ADMIN — DICYCLOMINE HYDROCHLORIDE 20 MG: 10 CAPSULE ORAL at 10:35

## 2024-09-27 NOTE — DISCHARGE INSTRUCTIONS
Follow-up with your primary care physician,  should drink plenty of fluids, I have sent Zofran for nausea vomiting, if this does not work, you can start taking Phenergan for the nausea vomiting, if you are still unable to keep anything down please return for reevaluation.  Have additionally sent Bentyl as needed for abdominal cramping.  Suspect you likely have a viral gastroenteritis.  Again, do not take the Zofran and Phenergan together, start with the Zofran if this does not work then you can try the Phenergan for nausea vomiting.  If you vomit any blood, or dark black stools or bright red stools, have fevers, worsening symptoms please return for reevaluation

## 2024-09-27 NOTE — ED PROVIDER NOTES
"Subjective  History of Present Illness:    Stephan is a 53-year-old male presenting today for evaluation of vomiting, he was sent by the urgent care, has a history of diabetes, hyperlipidemia, hypertension.  He reports 10 out of 10 generalized abdominal discomfort, dry heaving, vomiting, and nausea as well as diarrhea for the last 3 days.  He is alert and oriented.  At his baseline per the individual at bedside with him.  Denies any testicular pain, reports some urinary frequency, no dysuria hematuria urgency, no flank pain.  He denies any chest pain, when asked about shortness of breath, he reports \"maybe a little\".  When asked about cough he reports \"once\".  No hematochezia no melena no hematemesis.  No known fevers.  No known sick contacts.  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      Nurses Notes reviewed and agree, including vitals, allergies, social history and prior medical history.     REVIEW OF SYSTEMS: All systems reviewed and not pertinent unless noted.  Review of Systems   Constitutional:  Negative for chills and fever.   Respiratory:  Positive for cough and shortness of breath.    Cardiovascular:  Negative for chest pain and leg swelling.   Gastrointestinal:  Positive for abdominal pain, diarrhea, nausea and vomiting. Negative for blood in stool and constipation.   Genitourinary:  Positive for frequency. Negative for dysuria, hematuria, testicular pain and urgency.   All other systems reviewed and are negative.      Past Medical History:   Diagnosis Date    Diabetes mellitus     Hyperlipidemia     Hypertension        Allergies:    Patient has no known allergies.      History reviewed. No pertinent surgical history.      Social History     Socioeconomic History    Marital status: Single   Tobacco Use    Smoking status: Never    Smokeless tobacco: Never   Vaping Use    Vaping status: Never Used   Substance and Sexual Activity    Alcohol use: Never    Drug use: Never    Sexual activity: " "Defer         History reviewed. No pertinent family history.    Objective  Physical Exam:  BP (!) 193/106   Pulse 84   Temp 98.2 °F (36.8 °C) (Oral)   Resp 18   Ht 195.6 cm (77\")   Wt 116 kg (255 lb 11.7 oz)   SpO2 96%   BMI 30.33 kg/m²      Physical Exam  Vitals and nursing note reviewed.   Constitutional:       General: He is not in acute distress.     Appearance: He is normal weight. He is not ill-appearing, toxic-appearing or diaphoretic.   HENT:      Head: Normocephalic and atraumatic.      Nose: Nose normal.      Mouth/Throat:      Pharynx: Oropharynx is clear.   Eyes:      Extraocular Movements: Extraocular movements intact.   Cardiovascular:      Rate and Rhythm: Normal rate and regular rhythm.      Pulses: Normal pulses.      Heart sounds: Normal heart sounds.   Pulmonary:      Effort: Pulmonary effort is normal. No respiratory distress.      Breath sounds: Normal breath sounds. No stridor. No wheezing, rhonchi or rales.   Abdominal:      General: There is no distension.      Palpations: Abdomen is soft.      Tenderness: There is abdominal tenderness. There is no guarding or rebound.   Musculoskeletal:         General: Normal range of motion.      Cervical back: Normal range of motion.   Skin:     General: Skin is warm and dry.      Capillary Refill: Capillary refill takes less than 2 seconds.   Neurological:      General: No focal deficit present.      Mental Status: He is alert and oriented to person, place, and time. Mental status is at baseline.   Psychiatric:         Mood and Affect: Mood normal.         Behavior: Behavior normal.         Thought Content: Thought content normal.         Judgment: Judgment normal.               Procedures    ED Course:    ED Course as of 09/27/24 1330   Fri Sep 27, 2024   1032 I have reviewed the mid-level provider(s) note and verbally discussed the case/plan of care.  I was available for consultation as needed at all times during the patient's visit in the " Emergency Department.  I agree with the clinical impression, plan, and disposition unless otherwise stated in the MDM below.    ATTENDING ATTESTATION  HPI: 53 year old male presents with nausea, vomiting, diarrhea for the past several days. Non bloody in nature. Associated generalized abdominal pain.    MDM: ED workup reviewed.    EKG per my interpretation normal sinus rhythm, rate 75, leftward axis, no ST segment elevation or depression, nonspecific T wave abnormalities, normal QRS QTC intervals.    I have reviewed the labs results.  CBC shows leukocytosis.  CMP clinically unremarkable.  Lipase normal.  Troponin 25 -> 23.  Viral swabs negative.  UA shows trace hematuria without infection.  Lactic acid 1.3.    Radiology reports reviewed.     CT Abdomen Pelvis With Contrast    Result Date: 9/27/2024  No acute intra-abdominal process.    CTDI: 9.4 mGy DLP:531.94 mGy.cm  This report was signed and finalized on 9/27/2024 11:29 AM by Dipti Chamorro MD.      XR Chest 1 View    Result Date: 9/27/2024  No acute cardiopulmonary process.      This report was signed and finalized on 9/27/2024 10:51 AM by Dipti Chamorro MD.     [JS]   1222 Lactate: 1.3 [JR]   1327 Hemoglobin: 16.7 [JR]   1327 Hematocrit: 46.9 [JR]   1329 Potassium: 4.0 [JR]      ED Course User Index  [JR] Jamie Rubin PA-C  [JS] Joseph New DO       Lab Results (last 24 hours)       Procedure Component Value Units Date/Time    POC Glucose Once [[id number]]  (Normal) Resulted: 09/27/24 0952    Specimen: Blood Updated: 09/27/24 0952     Glucose 127 mg/dL     CBC & Differential [[id number]]  (Abnormal) Collected: 09/27/24 1023    Specimen: Blood Updated: 09/27/24 1159    Narrative:      The following orders were created for panel order CBC & Differential.  Procedure                               Abnormality         Status                     ---------                               -----------         ------                     CBC Auto Differential[[id number]]         Abnormal            Final result                 Please view results for these tests on the individual orders.    Comprehensive Metabolic Panel [[id number]]  (Abnormal) Collected: 09/27/24 1023    Specimen: Blood Updated: 09/27/24 1051     Glucose 145 mg/dL      BUN 20 mg/dL      Creatinine 1.21 mg/dL      Sodium 136 mmol/L      Potassium 4.0 mmol/L      Chloride 97 mmol/L      CO2 20.5 mmol/L      Calcium 9.4 mg/dL      Total Protein 8.1 g/dL      Albumin 4.4 g/dL      ALT (SGPT) 14 U/L      AST (SGOT) 17 U/L      Alkaline Phosphatase 100 U/L      Total Bilirubin 0.9 mg/dL      Globulin 3.7 gm/dL      A/G Ratio 1.2 g/dL      BUN/Creatinine Ratio 16.5     Anion Gap 18.5 mmol/L      eGFR 71.2 mL/min/1.73     Narrative:      GFR Normal >60  Chronic Kidney Disease <60  Kidney Failure <15      Lipase [[id number]]  (Normal) Collected: 09/27/24 1023    Specimen: Blood Updated: 09/27/24 1051     Lipase 24 U/L     CBC Auto Differential [[id number]]  (Abnormal) Collected: 09/27/24 1023    Specimen: Blood Updated: 09/27/24 1159     WBC 13.66 10*3/mm3      RBC 5.44 10*6/mm3      Hemoglobin 16.7 g/dL      Hematocrit 46.9 %      MCV 86.2 fL      MCH 30.7 pg      MCHC 35.6 g/dL      RDW 12.2 %      RDW-SD 38.5 fl      MPV 9.3 fL      Platelets 381 10*3/mm3      Neutrophil % 71.1 %      Lymphocyte % 15.9 %      Monocyte % 12.0 %      Eosinophil % 0.1 %      Basophil % 0.3 %      Immature Grans % 0.6 %      Neutrophils, Absolute 9.72 10*3/mm3      Lymphocytes, Absolute 2.17 10*3/mm3      Monocytes, Absolute 1.64 10*3/mm3      Eosinophils, Absolute 0.01 10*3/mm3      Basophils, Absolute 0.04 10*3/mm3      Immature Grans, Absolute 0.08 10*3/mm3      nRBC 0.0 /100 WBC     High Sensitivity Troponin T [[id number]]  (Abnormal) Collected: 09/27/24 1023    Specimen: Blood Updated: 09/27/24 1051     HS Troponin T 25 ng/L     Narrative:      High Sensitive Troponin T Reference Range:  <14.0 ng/L- Negative Female for AMI  <22.0 ng/L-  Negative Male for AMI  >=14 - Abnormal Female indicating possible myocardial injury.  >=22 - Abnormal Male indicating possible myocardial injury.   Clinicians would have to utilize clinical acumen, EKG, Troponin, and serial changes to determine if it is an Acute Myocardial Infarction or myocardial injury due to an underlying chronic condition.         COVID-19 and FLU A/B PCR, 1 HR TAT - Swab, Nasopharynx [[id number]]  (Normal) Collected: 09/27/24 1059    Specimen: Swab from Nasopharynx Updated: 09/27/24 1158     COVID19 Not Detected     Influenza A PCR Not Detected     Influenza B PCR Not Detected    Narrative:      Fact sheet for providers: https://www.fda.gov/media/304287/download    Fact sheet for patients: https://www.fda.gov/media/111681/download    Test performed by PCR.    Urinalysis With Microscopic If Indicated (No Culture) - Urine, Clean Catch [[id number]]  (Abnormal) Collected: 09/27/24 1144    Specimen: Urine, Clean Catch Updated: 09/27/24 1158     Color, UA Yellow     Appearance, UA Clear     pH, UA 5.5     Specific Gravity, UA 1.028     Glucose, UA >=1000 mg/dL (3+)     Ketones, UA 15 mg/dL (1+)     Bilirubin, UA Negative     Blood, UA Trace     Protein,  mg/dL (2+)     Leuk Esterase, UA Negative     Nitrite, UA Negative     Urobilinogen, UA 1.0 E.U./dL    Urinalysis, Microscopic Only - Urine, Clean Catch [[id number]]  (Abnormal) Collected: 09/27/24 1144    Specimen: Urine, Clean Catch Updated: 09/27/24 1205     RBC, UA 0-2 /HPF      WBC, UA 0-2 /HPF      Bacteria, UA Trace /HPF      Squamous Epithelial Cells, UA 0-2 /HPF      Hyaline Casts, UA None Seen /LPF      Mucus, UA Trace /HPF      Methodology Manual Light Microscopy    Lactic Acid, Plasma [[id number]]  (Normal) Collected: 09/27/24 1159    Specimen: Blood Updated: 09/27/24 1221     Lactate 1.3 mmol/L     High Sensitivity Troponin T 2Hr [[id number]]  (Abnormal) Collected: 09/27/24 1159    Specimen: Blood Updated: 09/27/24 1225     HS Troponin  T 23 ng/L      Troponin T Delta -2 ng/L     Narrative:      High Sensitive Troponin T Reference Range:  <14.0 ng/L- Negative Female for AMI  <22.0 ng/L- Negative Male for AMI  >=14 - Abnormal Female indicating possible myocardial injury.  >=22 - Abnormal Male indicating possible myocardial injury.   Clinicians would have to utilize clinical acumen, EKG, Troponin, and serial changes to determine if it is an Acute Myocardial Infarction or myocardial injury due to an underlying chronic condition.                  CT Abdomen Pelvis With Contrast    Result Date: 9/27/2024  PROCEDURE: CT ABDOMEN PELVIS W CONTRAST-  HISTORY: Vomiting, abdominal pain, diarrhea  COMPARISON: October 18, 2020..  PROCEDURE: The patient was injected with IV contrast. Axial images were obtained from the lung bases to the pubic symphysis by computed tomography. This study was performed with techniques to keep radiation doses as low as reasonably achievable, (ALARA). Individualized dose reduction techniques using automated exposure control or adjustment of mA and/or kV according to the patient size were employed.  FINDINGS:  ABDOMEN: The lung bases are clear. The heart is proper size. The liver is homogenous with no focal abnormality. Gallbladder present with no CT visible stones. The spleen is unremarkable. No adrenal mass is present. The pancreas is unremarkable. The kidneys are unremarkable, without evidence of mass or hydronephrosis. There is mild, bilateral, symmetric perirenal stranding. Mild vascular calcifications noted. The aorta is proper caliber. There is no free fluid or adenopathy.  PELVIS: The GI tract demonstrates no obstruction.  The appendix is identified and appears normal. The urinary bladder is unremarkable. There is no free fluid, adenopathy, or inflammatory process. Prostate is normal in size.      Impression: No acute intra-abdominal process.    CTDI: 9.4 mGy DLP:531.94 mGy.cm  This report was signed and finalized on  9/27/2024 11:29 AM by Dipti Chamorro MD.      XR Chest 1 View    Result Date: 9/27/2024  PROCEDURE: XR CHEST 1 VW-  HISTORY: SOA  COMPARISON: December 26, 2014..  FINDINGS: The heart is normal in size. The lungs are clear. The mediastinum is unremarkable. There is no pneumothorax.  There are no acute osseous abnormalities.      Impression: No acute cardiopulmonary process.      This report was signed and finalized on 9/27/2024 10:51 AM by Dipti Chamorro MD.          MDM     Amount and/or Complexity of Data Reviewed  Clinical lab tests: reviewed  Tests in the radiology section of CPT®: reviewed  Tests in the medicine section of CPT®: reviewed        Initial impression of presenting illness: Stephan is a 53-year-old male presenting from urgent care for evaluation of abdominal pain.  Sister-in-law at bedside.    DDX: includes but is not limited to: Colitis, gastroenteritis, sclerosing mesenteritis, appendicitis, pancreatitis, gallbladder abnormality, diverticulitis, viral GI illness, pneumonia, gastritis, peptic ulcer disease, pneumothorax, DKA, aspiration pneumonia, electrolyte imbalance, arrhythmia, others    Patient arrives hemodynamically stable afebrile nontachycardic nontachypneic nonhypoxic with vitals interpreted by myself.     Pertinent features from physical exam: Abdomen soft, generally nonreactive palpation but patient expresses general discomfort, no focal findings, certainly no peritonitis of the abdomen.  Oropharynx is clear, cardiac auscultation regular rhythm lungs were clear bilaterally, alert and oriented.    Initial diagnostic plan: CBC CMP lactic acid lipase troponin urinalysis chest x-ray CT abdomen pelvis with contrast and a COVID flu test    Results from initial plan were reviewed and interpreted by me revealing CT abdomen pelvis per radiology reveals no acute intra-abdominal process.  Chest x-ray no acute cardiopulmonary process.  CMP relatively unremarkable.  Glucose 145.  Initial troponin 25,  will repeat delta, CMP with a anion gap of 18.5 glucose of 145, CO2 of 20.5, however DKA not suspected secondary to glucose of only 145.  Mild anion gap likely secondary to starvation ketoacidosis from nausea vomiting.  Delta troponin with a delta of -2.  Lactic was normal.  Urinalysis negative for infectious process.  Did have ketones likely secondary to mild dehydration.  COVID flu is negative.  Leukocytosis likely secondary to vomiting.  EKG sinus rhythm rate 75, ACS not suspected.    Diagnostic information from other sources: Prior record reviewed    Interventions / Re-evaluation: Toradol, Bentyl, Zofran, 1 L fluids.  Hydralazine for hypertension, morphine for further pain.,  Patient had no further episodes of vomiting, blood pressure improved to 160 systolic, however prior to discharge, patient started vomiting again, will provide Compazine Benadryl for further antiemetic, observe for approximate other hour after Compazine injection, he is now much improved, no further emesis.  Stable for discharge.    Results/clinical rationale were discussed with patient at bedside.  Suspect viral gastroenteritis.  Will discharge home with Bentyl, Zofran.  Patient to follow-up with primary care physician return precautions given.    Consultations/Discussion of results with other physicians: Discussed with ED attending physician    Disposition plan: Discharge.  Will send Zofran and Phenergan as backup antiemetic, he was given strict return precautions for continued emesis despite antiemetics or worsening symptoms.  Suspect viral gastroenteritis, recommended continuing taking his blood pressure medicines as prescribed.  Suspect he may have not absorbed orally his blood pressure medications this morning due to the amount of vomiting that he has had.  His blood pressures improved here to the 160 systolic range.  -----    Final diagnoses:   Gastroenteritis   Elevated blood pressure reading          Jamie Rubin PA-C  09/27/24  [id number]